# Patient Record
Sex: FEMALE | Race: WHITE | Employment: OTHER | URBAN - METROPOLITAN AREA
[De-identification: names, ages, dates, MRNs, and addresses within clinical notes are randomized per-mention and may not be internally consistent; named-entity substitution may affect disease eponyms.]

---

## 2017-01-24 ENCOUNTER — TELEPHONE (OUTPATIENT)
Dept: OBGYN CLINIC | Facility: CLINIC | Age: 47
End: 2017-01-24

## 2017-01-24 ENCOUNTER — HOSPITAL ENCOUNTER (OUTPATIENT)
Dept: ULTRASOUND IMAGING | Facility: HOSPITAL | Age: 47
Discharge: HOME | End: 2017-01-24
Attending: OBSTETRICS & GYNECOLOGY
Payer: COMMERCIAL

## 2017-01-24 DIAGNOSIS — R10.2 PELVIC PAIN IN FEMALE: ICD-10-CM

## 2017-01-24 PROCEDURE — 76856 US EXAM PELVIC COMPLETE: CPT

## 2017-01-24 PROCEDURE — 76830 TRANSVAGINAL US NON-OB: CPT

## 2017-01-24 PROCEDURE — 93975 VASCULAR STUDY: CPT

## 2017-01-24 NOTE — TELEPHONE ENCOUNTER
Per pt she was not able to schedule her pelvis us, she was told by the central scheduling a nurse from dr Philip Osei has to call it in and schedule it for the pt

## 2017-01-24 NOTE — TELEPHONE ENCOUNTER
Pt wants to have pelvic US done today since she has pain and is off work. Pt was told same day appts are not available. Called US and got pt appt for 1230 today at [facility].  Pt instructed to drink 32 oz of water, finish drinking by 1130 and keep fu

## 2017-01-26 NOTE — TELEPHONE ENCOUNTER
Informed pt that she had a normal pelvic ultrasound. Informed pt that she has a small but normal right ovarian cyst that is not of concern nor will cause pain. Informed pt that her IUD seems to be in place. Denies pain today.  Pt states Tuesday, 1/24

## 2017-02-15 ENCOUNTER — HOSPITAL ENCOUNTER (OUTPATIENT)
Dept: CT IMAGING | Age: 47
Discharge: HOME | End: 2017-02-15
Attending: INTERNAL MEDICINE

## 2017-02-15 VITALS — HEART RATE: 112 BPM | SYSTOLIC BLOOD PRESSURE: 134 MMHG | DIASTOLIC BLOOD PRESSURE: 98 MMHG

## 2017-02-15 DIAGNOSIS — Z82.49 FAMILY HISTORY OF HEART DISEASE: ICD-10-CM

## 2017-02-24 PROCEDURE — 87624 HPV HI-RISK TYP POOLED RSLT: CPT | Performed by: OBSTETRICS & GYNECOLOGY

## 2017-02-24 PROCEDURE — 88175 CYTOPATH C/V AUTO FLUID REDO: CPT | Performed by: OBSTETRICS & GYNECOLOGY

## 2017-06-29 PROCEDURE — 87427 SHIGA-LIKE TOXIN AG IA: CPT | Performed by: INTERNAL MEDICINE

## 2017-06-29 PROCEDURE — 87177 OVA AND PARASITES SMEARS: CPT | Performed by: INTERNAL MEDICINE

## 2017-06-29 PROCEDURE — 87046 STOOL CULTR AEROBIC BACT EA: CPT | Performed by: INTERNAL MEDICINE

## 2017-06-29 PROCEDURE — 87269 GIARDIA AG IF: CPT | Performed by: INTERNAL MEDICINE

## 2017-06-29 PROCEDURE — 87045 FECES CULTURE AEROBIC BACT: CPT | Performed by: INTERNAL MEDICINE

## 2017-06-29 PROCEDURE — 87493 C DIFF AMPLIFIED PROBE: CPT | Performed by: INTERNAL MEDICINE

## 2017-06-29 PROCEDURE — 87209 SMEAR COMPLEX STAIN: CPT | Performed by: INTERNAL MEDICINE

## 2020-03-23 PROCEDURE — 88342 IMHCHEM/IMCYTCHM 1ST ANTB: CPT | Performed by: RADIOLOGY

## 2020-03-23 PROCEDURE — 88305 TISSUE EXAM BY PATHOLOGIST: CPT | Performed by: RADIOLOGY

## 2020-03-23 PROCEDURE — 88360 TUMOR IMMUNOHISTOCHEM/MANUAL: CPT | Performed by: RADIOLOGY

## 2020-03-31 PROBLEM — C50.912 INVASIVE LOBULAR CARCINOMA OF LEFT BREAST IN FEMALE (HCC): Status: ACTIVE | Noted: 2020-03-31

## 2020-07-27 PROCEDURE — 88305 TISSUE EXAM BY PATHOLOGIST: CPT | Performed by: INTERNAL MEDICINE

## 2020-12-21 ENCOUNTER — ORDER TRANSCRIPTION (OUTPATIENT)
Dept: PHYSICAL THERAPY | Facility: HOSPITAL | Age: 50
End: 2020-12-21

## 2020-12-21 ENCOUNTER — TELEPHONE (OUTPATIENT)
Dept: PHYSICAL THERAPY | Facility: HOSPITAL | Age: 50
End: 2020-12-21

## 2020-12-21 DIAGNOSIS — Z98.890 H/O BREAST RECONSTRUCTION: Primary | ICD-10-CM

## 2020-12-22 ENCOUNTER — TELEPHONE (OUTPATIENT)
Dept: PHYSICAL THERAPY | Facility: HOSPITAL | Age: 50
End: 2020-12-22

## 2020-12-22 ENCOUNTER — ORDER TRANSCRIPTION (OUTPATIENT)
Dept: PHYSICAL THERAPY | Facility: HOSPITAL | Age: 50
End: 2020-12-22

## 2020-12-22 DIAGNOSIS — Z98.890 H/O BREAST RECONSTRUCTION: Primary | ICD-10-CM

## 2020-12-23 ENCOUNTER — OFFICE VISIT (OUTPATIENT)
Dept: PHYSICAL THERAPY | Facility: HOSPITAL | Age: 50
End: 2020-12-23
Attending: PHYSICIAN ASSISTANT
Payer: COMMERCIAL

## 2020-12-23 DIAGNOSIS — Z98.890 H/O BREAST RECONSTRUCTION: ICD-10-CM

## 2020-12-23 PROCEDURE — 97161 PT EVAL LOW COMPLEX 20 MIN: CPT | Performed by: PHYSICAL THERAPIST

## 2020-12-23 PROCEDURE — 97140 MANUAL THERAPY 1/> REGIONS: CPT | Performed by: PHYSICAL THERAPIST

## 2020-12-23 NOTE — PROGRESS NOTES
UE LYMPHEDEMA EVALUATION:     Referring Physician: Dr. Tash Claros  Diagnosis:  H/O breast reconstruction (E10.055)    Date of onset: 4/15/2020 Evaluation Date: 12/23/2020     PATIENT SUMMARY   Esteban Diaz is a 45year old female who presents to therapy tod therapist discussed evaluation findings, lymphedema education, POC and self care/management.  Skilled Physical Therapy is medically necessary for stretching, strengthening, posture re-education,  Complete Decongestive Therapy to reduce swelling and decrease (directing to R axilla and R inguinal: pt risk for lymphedema is the left side)    Initiated self MLD instructions    Compression:  - will measure/fit for compression sleeve at next appt      There Ex ( 5 minutes):  - given written instr for ROM exercises return this letter via fax as soon as possible to [phone].  If you have any questions, please contact me at Dept: [phone]    Sincerely,  Electronically signed by therapist: Milla Palma, PT  [de-identified] certification required: Yes  I certify the

## 2020-12-24 ENCOUNTER — TELEPHONE (OUTPATIENT)
Dept: PHYSICAL THERAPY | Facility: HOSPITAL | Age: 50
End: 2020-12-24

## 2020-12-28 ENCOUNTER — APPOINTMENT (OUTPATIENT)
Dept: PHYSICAL THERAPY | Facility: HOSPITAL | Age: 50
End: 2020-12-28
Attending: PHYSICIAN ASSISTANT
Payer: COMMERCIAL

## 2021-01-06 ENCOUNTER — OFFICE VISIT (OUTPATIENT)
Dept: PHYSICAL THERAPY | Facility: HOSPITAL | Age: 51
End: 2021-01-06
Attending: PHYSICIAN ASSISTANT
Payer: COMMERCIAL

## 2021-01-06 PROCEDURE — 97110 THERAPEUTIC EXERCISES: CPT | Performed by: PHYSICAL THERAPIST

## 2021-01-06 NOTE — PROGRESS NOTES
Referring Physician: Dr. Davida Puga  Diagnosis:  H/O breast reconstruction (B76.213)    Date of onset: 4/15/2020 Evaluation Date: 12/23/2020           Physical Therapy Lymphedema Daily Note    Precautions:  Breast cancer   Education or treatment limitation: instructions    Compression:  - will measure/fit for compression sleeve at next appt   Manual Therapy- HOLD   There Ex ( 5 minutes):  - given written instr for ROM exercises    There Ex (50 min):  - reassessed ROM  - pulleys flex and abd x 5 minutes  - MELLY

## 2021-01-08 ENCOUNTER — OFFICE VISIT (OUTPATIENT)
Dept: PHYSICAL THERAPY | Facility: HOSPITAL | Age: 51
End: 2021-01-08
Attending: PHYSICIAN ASSISTANT
Payer: COMMERCIAL

## 2021-01-08 PROCEDURE — 97110 THERAPEUTIC EXERCISES: CPT | Performed by: PHYSICAL THERAPIST

## 2021-01-08 NOTE — PROGRESS NOTES
Referring Physician: Dr. Sami Fernandez  Diagnosis:  H/O breast reconstruction (P32.779)    Date of onset: 4/15/2020 Evaluation Date: 12/23/2020           Physical Therapy Lymphedema Daily Note    Precautions:  Breast cancer   Education or treatment limitation: instructions    Compression:  - will measure/fit for compression sleeve at next appt   Manual Therapy- HOLD    There Ex ( 5 minutes):  - given written instr for ROM exercises    There Ex (50 min):  - reassessed ROM  - pulleys flex and abd x 5 minutes  - ARIN

## 2021-01-12 ENCOUNTER — OFFICE VISIT (OUTPATIENT)
Dept: PHYSICAL THERAPY | Facility: HOSPITAL | Age: 51
End: 2021-01-12
Attending: PHYSICIAN ASSISTANT
Payer: COMMERCIAL

## 2021-01-12 PROCEDURE — 97140 MANUAL THERAPY 1/> REGIONS: CPT

## 2021-01-14 ENCOUNTER — APPOINTMENT (OUTPATIENT)
Dept: PHYSICAL THERAPY | Facility: HOSPITAL | Age: 51
End: 2021-01-14
Attending: PHYSICIAN ASSISTANT
Payer: COMMERCIAL

## 2021-01-19 ENCOUNTER — OFFICE VISIT (OUTPATIENT)
Dept: PHYSICAL THERAPY | Facility: HOSPITAL | Age: 51
End: 2021-01-19
Attending: PHYSICIAN ASSISTANT
Payer: COMMERCIAL

## 2021-01-19 PROCEDURE — 97110 THERAPEUTIC EXERCISES: CPT | Performed by: PHYSICAL THERAPIST

## 2021-01-19 NOTE — PROGRESS NOTES
Referring Physician: Dr. Geraldine Benedict  Diagnosis:  H/O breast reconstruction (W86.473)    Date of onset: 4/15/2020 Evaluation Date: 12/23/2020           Physical Therapy Lymphedema Daily Note    Precautions:  Breast cancer   Education or treatment limitation: To:3/23/2021   * NEW ORDER FROM MD, NO LYMPHEDEMA THERAPY, ONLY TO DO ROM * 1/6/2021    1/12/2021 1/19/2021   4/10 5/10   Manual therapy - HOLD until MD order received Manual therapy - HOLD until MD order received   There Ex (      - Hookly LTR (to stretch Increase UE strength to 4+/5 to improve ease of lifting and performing household chores    Plan:   Cont w/ ROM exercises  Await approval from MD for lymphedema, manual therapy         Charges: therex 3    Total Timed Treatment: 45 min  Total Treatment Time

## 2021-01-21 ENCOUNTER — OFFICE VISIT (OUTPATIENT)
Dept: PHYSICAL THERAPY | Facility: HOSPITAL | Age: 51
End: 2021-01-21
Attending: PHYSICIAN ASSISTANT
Payer: COMMERCIAL

## 2021-01-21 PROCEDURE — 97140 MANUAL THERAPY 1/> REGIONS: CPT

## 2021-01-21 NOTE — PROGRESS NOTES
Referring Physician: Dr. Cuellar Number  Diagnosis:  H/O breast reconstruction (Y93.657)    Date of onset: 4/15/2020 Evaluation Date: 12/23/2020           Physical Therapy Lymphedema Daily Note    Precautions:  Breast cancer   Education or treatment limitation: 1/19/2021 1/21/2021   4/10 5/10 6/10   Manual therapy - HOLD until MD order received Manual therapy - HOLD until MD order received Manual therapy (40 minutes)  (No A-A as L side at risk for lymphedema)  MLD provided:  Neck sequence, deep breathing, abdomin MLD steps while performing treatment    Goals:    To be met in 10 visits:  1) Decrease swelling trunk/breast/abd areas by a total of 4 cm to improve ROM and fit of clothing - Ning Vides MD APPROVAL  2) Pt to be independent with self MLD, ROM exercis

## 2021-01-26 ENCOUNTER — OFFICE VISIT (OUTPATIENT)
Dept: PHYSICAL THERAPY | Facility: HOSPITAL | Age: 51
End: 2021-01-26
Attending: PHYSICIAN ASSISTANT
Payer: COMMERCIAL

## 2021-01-26 PROCEDURE — 97140 MANUAL THERAPY 1/> REGIONS: CPT | Performed by: PHYSICAL THERAPIST

## 2021-01-26 NOTE — PROGRESS NOTES
Referring Physician: Dr. Cali Beyer  Diagnosis:  H/O breast reconstruction (T56.042)    Date of onset: 4/15/2020 Evaluation Date: 12/23/2020           Physical Therapy Lymphedema Daily Note    Precautions:  Breast cancer   Education or treatment limitation: B breasts (especially the right)  - swelling B trunk and abdominal area   - circumference:    - under axilla (arms down): 104.6    - 14 cm inf from sternal notch: 108.8    - 21 cm inf from sternal notch:108.3    - 30 cm inf from sternal notch: 92.8  - inci str x 20 s x 4 (different levels)  - Wall wash circles in flex and abd CW and CCW x 10 each B  - SB flex up wall x 10  - SB side to side roll in flex x 10   - SB gentle thoracic extension at wall, back on ball x 15  *discussed with pt different sizes of SB

## 2021-01-28 ENCOUNTER — OFFICE VISIT (OUTPATIENT)
Dept: PHYSICAL THERAPY | Facility: HOSPITAL | Age: 51
End: 2021-01-28
Attending: PHYSICIAN ASSISTANT
Payer: COMMERCIAL

## 2021-01-28 PROCEDURE — 97140 MANUAL THERAPY 1/> REGIONS: CPT

## 2021-01-28 NOTE — PROGRESS NOTES
Referring Physician: Dr. Maya Kelly  Diagnosis:  H/O breast reconstruction (A29.974)    Date of onset: 4/15/2020 Evaluation Date: 12/23/2020           Physical Therapy Lymphedema Daily Note    Precautions:  Breast cancer   Education or treatment limitation: (but slight tightness noted on R)    ER 90 degrees B    Shoulder strength: Deferred due to limited ROM    Edema/Tissue Observations:    - swelling B breasts (especially the right)  - swelling B trunk and abdominal area   - circumference:    - under axilla and trunk, B A-I. Scar massage of B inferior breast scars. Pt has purchased silicone scar pad and hopes to have it by tomorrow. Compression:  -Pt wears foam pad as needed.   -pt was fitted for compression bras and compression sleeve by [facility] once discharged  3) Increase B shoulder AROM flex and abd to 170 degrees to improve ease of dressing/bathing/and reaching overhead  4) Increase UE strength to 4+/5 to improve ease of lifting and performing household chores    Plan:   Cont w/ ROM

## 2021-02-01 ENCOUNTER — OFFICE VISIT (OUTPATIENT)
Dept: PHYSICAL THERAPY | Facility: HOSPITAL | Age: 51
End: 2021-02-01
Attending: PHYSICIAN ASSISTANT
Payer: COMMERCIAL

## 2021-02-01 PROCEDURE — 97140 MANUAL THERAPY 1/> REGIONS: CPT

## 2021-02-01 PROCEDURE — 97110 THERAPEUTIC EXERCISES: CPT

## 2021-02-01 NOTE — PATIENT INSTRUCTIONS
*these will help stretch your Latissimus Muscle    1. Get in position as shown    Hold 10 seconds    Repeat 10 times    2.  Upward salute (Yoga pose)  Hold 10 seconds  Repeat 10 times

## 2021-02-03 ENCOUNTER — OFFICE VISIT (OUTPATIENT)
Dept: PHYSICAL THERAPY | Facility: HOSPITAL | Age: 51
End: 2021-02-03
Attending: PHYSICIAN ASSISTANT
Payer: COMMERCIAL

## 2021-02-03 PROCEDURE — 97140 MANUAL THERAPY 1/> REGIONS: CPT

## 2021-02-03 NOTE — PROGRESS NOTES
Referring Physician: Dr. Maya Kelly  Diagnosis:  H/O breast reconstruction (D99.853)    Date of onset: 4/15/2020 Evaluation Date: 12/23/2020       Progress Summary    Pt has attended 10  visits in Physical Therapy.      ASSESSMENT: Patient has improved AROM 168, L 160     Abd: R 167, L 160  - swelling R breast, mostly medially (+pitting)  - swelling L breast, mostly laterally   - swelling B trunk and abdominal area   - circumference:    - under axilla (arms down):102.8  (IE: 104.6)    - 14 cm inf from sternal breast and trunk, B A-I. Scar massage of B inferior breast scars. Pt has purchased silicone scar pad and hopes to have it by tomorrow. Compression:  -Pt wears foam pad as needed.   -pt was fitted for compression bras and compression sleeve by [employer] w/ ROM exercises, manual techniques to improve scar mobility, MLD of B breast  MLD approved by MD 1/21/2021        Charges: mm4  Total Timed Treatment: 58 min  Total Treatment Time: 58min

## 2021-02-05 ENCOUNTER — APPOINTMENT (OUTPATIENT)
Dept: PHYSICAL THERAPY | Facility: HOSPITAL | Age: 51
End: 2021-02-05
Attending: PHYSICIAN ASSISTANT
Payer: COMMERCIAL

## 2021-02-08 ENCOUNTER — OFFICE VISIT (OUTPATIENT)
Dept: PHYSICAL THERAPY | Facility: HOSPITAL | Age: 51
End: 2021-02-08
Attending: PHYSICIAN ASSISTANT
Payer: COMMERCIAL

## 2021-02-08 PROCEDURE — 97140 MANUAL THERAPY 1/> REGIONS: CPT | Performed by: PHYSICAL THERAPIST

## 2021-02-08 NOTE — PROGRESS NOTES
Referring Physician: Dr. Juan Weaver  Diagnosis:  H/O breast reconstruction (I66.944)    Date of onset: 4/15/2020 Evaluation Date: 12/23/2020         Physical Therapy Lymphedema Daily Note    Precautions:  Breast cancer   Education or treatment limitation: No mod loss, Rot and SB min loss  Poor thoracic mobility    1/12/2021  AROM:     Shoulder AROM:    Flex: R 150, L 140 (improved about 10 deg)    1/6/2021:  AROM:     Shoulder AROM:    Flex: R 150, L 130    Abd: R 158, L 160    IR WFL (but slight tightness not B axilla, B inguinal, B breast and trunk, B A-I. Scar massage to B inferior breast scars. Pt received silicone scar pad. Compression:  Pt wearing new compression bra. Pt also has new OTS compression sleeve and gauntlet.   (both were purchased throug 2/3/2021    Plan:   Cont w/ ROM exercises, manual techniques to improve scar mobility, MLD of B breast  MLD approved by MD 1/21/2021        Charges: mm3    Total Timed Treatment: 45 min  Total Treatment Time: 45 min

## 2021-02-12 ENCOUNTER — OFFICE VISIT (OUTPATIENT)
Dept: PHYSICAL THERAPY | Facility: HOSPITAL | Age: 51
End: 2021-02-12
Attending: PHYSICIAN ASSISTANT
Payer: COMMERCIAL

## 2021-02-12 PROCEDURE — 97140 MANUAL THERAPY 1/> REGIONS: CPT

## 2021-02-12 NOTE — PROGRESS NOTES
Referring Physician: Dr. Deshawn Ireland  Diagnosis:  H/O breast reconstruction (C75.659)    Date of onset: 4/15/2020 Evaluation Date: 12/23/2020         Physical Therapy Lymphedema Daily Note    Precautions:  Breast cancer   Education or treatment limitation: N ext mod loss, Rot and SB min loss  Poor thoracic mobility    1/12/2021  AROM:     Shoulder AROM:    Flex: R 150, L 140 (improved about 10 deg)    1/6/2021:  AROM:     Shoulder AROM:    Flex: R 150, L 130    Abd: R 158, L 160    IR WFL (but slight tightness abdominal sequence, B axilla, B inguinal, B breast and trunk, B A-I. Scar massage to B inferior breast scars. Pt received silicone scar pad. Compression:  Pt wearing new compression bra. Pt also has new OTS compression sleeve and gauntlet.   (both w reduction and to be independent at self management once discharged - ALMOST MET 2/3/2021  3) Increase B shoulder AROM flex and abd to 170 degrees to improve ease of dressing/bathing/and reaching overhead - ALMOST MET 2/3/2021  4) Increase UE strength to 4+

## 2021-02-15 ENCOUNTER — TELEPHONE (OUTPATIENT)
Dept: PHYSICAL THERAPY | Facility: HOSPITAL | Age: 51
End: 2021-02-15

## 2021-02-15 ENCOUNTER — APPOINTMENT (OUTPATIENT)
Dept: PHYSICAL THERAPY | Facility: HOSPITAL | Age: 51
End: 2021-02-15
Attending: PHYSICIAN ASSISTANT
Payer: COMMERCIAL

## 2021-02-15 NOTE — TELEPHONE ENCOUNTER
Pt called to cx due to weather and also has another appt she cannot miss that she wouldn't have time to drive to w/ the bad weather

## 2021-02-18 ENCOUNTER — OFFICE VISIT (OUTPATIENT)
Dept: PHYSICAL THERAPY | Facility: HOSPITAL | Age: 51
End: 2021-02-18
Attending: PHYSICIAN ASSISTANT
Payer: COMMERCIAL

## 2021-02-18 PROCEDURE — 97140 MANUAL THERAPY 1/> REGIONS: CPT

## 2021-02-18 NOTE — PROGRESS NOTES
Referring Physician: Dr. Jc Neri ref.  provider found  Diagnosis:  H/O breast reconstruction (Z93.009)    Date of onset: 4/15/2020 Evaluation Date: 12/23/2020         Physical Therapy Lymphedema Daily Note    Precautions:  Breast cancer   Education or treatmen Shoulder AROM:    Flex: 155 B     Abd: R 160, L 150  Lumbar AROM: flex WFL, ext mod loss, Rot and SB min loss  Poor thoracic mobility    1/12/2021  AROM:     Shoulder AROM:    Flex: R 150, L 140 (improved about 10 deg)    1/6/2021:  AROM:     Shoulder A minutes)    MLD:   (No A-A as L side at risk for lymphedema)  MLD provided:  Neck sequence, deep breathing, abdominal sequence, B axilla, B inguinal, B breast and trunk, B A-I. Scar massage to B inferior breast scars. Pt received silicone scar pad. techniques provided. Scar tissue massage to release lateral L scar with improved mobility noted at end of session. Goals:    To be met in 10 visits:  1) Decrease swelling trunk/breast/abd areas by a total of 4 cm to improve ROM and fit of clothing - ALMOS

## 2021-02-23 ENCOUNTER — OFFICE VISIT (OUTPATIENT)
Dept: PHYSICAL THERAPY | Facility: HOSPITAL | Age: 51
End: 2021-02-23
Attending: PHYSICIAN ASSISTANT
Payer: COMMERCIAL

## 2021-02-23 PROCEDURE — 97140 MANUAL THERAPY 1/> REGIONS: CPT | Performed by: PHYSICAL THERAPIST

## 2021-02-23 NOTE — PROGRESS NOTES
Referring Physician: Dr. Shad Natarajan  Diagnosis:  H/O breast reconstruction (X86.515)    Date of onset: 4/15/2020 Evaluation Date: 12/23/2020         Physical Therapy Lymphedema Daily Note    Precautions:  Breast cancer   Education or treatment limitation: N (IE) 92.8  -  1/19/2021:   AROM:     Shoulder AROM:    Flex: 155 B     Abd: R 160, L 150  Lumbar AROM: flex WFL, ext mod loss, Rot and SB min loss  Poor thoracic mobility    1/12/2021  AROM:     Shoulder AROM:    Flex: R 150, L 140 (improved about 10 deg) and trunk, B A-I.   Scar massage to B inferior breast    Scap mobs B Manual therapy (43 minutes)  MLD (No A-A as L side at risk for lymphedema)  MLD provided:  Neck sequence, deep breathing, abdominal sequence, B axilla, B inguinal, B breast and trunk, B A-

## 2021-02-25 ENCOUNTER — OFFICE VISIT (OUTPATIENT)
Dept: PHYSICAL THERAPY | Facility: HOSPITAL | Age: 51
End: 2021-02-25
Attending: PHYSICIAN ASSISTANT
Payer: COMMERCIAL

## 2021-02-25 PROCEDURE — 97140 MANUAL THERAPY 1/> REGIONS: CPT | Performed by: PHYSICAL THERAPIST

## 2021-02-25 NOTE — PROGRESS NOTES
Referring Physician: Dr. Sami Fernandez  Diagnosis:  H/O breast reconstruction (Q95.213)    Date of onset: 4/15/2020 Evaluation Date: 12/23/2020         Physical Therapy Lymphedema Daily Note    Precautions:  Breast cancer   Education or treatment limitation: N 104. 6)    - 14 cm inf from sternal notch (over breasts):109.5  (IE: 108.8)    - 21 cm inf from sternal notch (over breasts): 108.3  (.3)    - 30 cm inf from sternal notch: 93  (IE) 92.8  -  1/19/2021:   AROM:     Shoulder AROM:    Flex: 155 B     Abd to notify the surgeon Manual therapy (40 minutes)    MLD (No A-A as L side at risk for lymphedema)  MLD provided:  Neck sequence, deep breathing, abdominal sequence, B axilla, B inguinal, B breast and trunk, B A-I.   Scar massage to B inferior breast    Sca 2/3/2021  4) Increase UE strength to 4+/5 to improve ease of lifting and performing household chores - MET 2/3/2021    Plan:   Next session: will train patient how to apply kinesiotape to be able to self manage  Cont w/ ROM exercises, manual techniques to

## 2021-03-02 ENCOUNTER — OFFICE VISIT (OUTPATIENT)
Dept: PHYSICAL THERAPY | Facility: HOSPITAL | Age: 51
End: 2021-03-02
Attending: PHYSICIAN ASSISTANT
Payer: COMMERCIAL

## 2021-03-02 PROCEDURE — 97140 MANUAL THERAPY 1/> REGIONS: CPT

## 2021-03-02 NOTE — PROGRESS NOTES
Referring Physician: Dr. Tash Claros  Diagnosis:  H/O breast reconstruction (V15.266)    Date of onset: 4/15/2020 Evaluation Date: 12/23/2020         Physical Therapy Lymphedema Daily Note    Precautions:  Breast cancer   Education or treatment limitation: N L 160  - swelling R breast, mostly medially (+pitting)  - swelling L breast, mostly laterally   - swelling B trunk and abdominal area   - circumference:    - under axilla (arms down):102.8  (IE: 104.6)    - 14 cm inf from sternal notch (over breasts):109.5 breathing, abdominal sequence, B axilla, B inguinal, B breast and trunk, B A-I.     Scar massage to B inferior    Instr pt to monitor area of scratches- if increased redness or swelling/or any signs of infection to notify the surgeon Manual therapy (40 art reduce swelling medial and lateral breast tissue.   *reviewed with pt tape cutting techniques to make \"fingers\" for swelling reduction taping technique      Performing yoga at home    *Pt is independent with her HEP                 Assessment: Continues t

## 2021-03-09 ENCOUNTER — OFFICE VISIT (OUTPATIENT)
Dept: PHYSICAL THERAPY | Facility: HOSPITAL | Age: 51
End: 2021-03-09
Attending: PHYSICIAN ASSISTANT
Payer: COMMERCIAL

## 2021-03-09 PROCEDURE — 97140 MANUAL THERAPY 1/> REGIONS: CPT | Performed by: PHYSICAL THERAPIST

## 2021-03-22 ENCOUNTER — OFFICE VISIT (OUTPATIENT)
Dept: PHYSICAL THERAPY | Facility: HOSPITAL | Age: 51
End: 2021-03-22
Attending: PHYSICIAN ASSISTANT
Payer: COMMERCIAL

## 2021-03-22 PROCEDURE — 97140 MANUAL THERAPY 1/> REGIONS: CPT | Performed by: PHYSICAL THERAPIST

## 2021-03-22 NOTE — PROGRESS NOTES
Referring Physician: Dr. Alanna Palacios  Diagnosis:  H/O breast reconstruction (S61.250)    Date of onset: 4/15/2020 Evaluation Date: 12/23/2020     Discharge Summary    Pt has attended 18 visits in Physical Therapy.      Assessment: Pt w/ improved ROM, strength MEASUREMENT G 35 34.8   MEASUREMENT H 37.4 37.3   MEASUREMENT I 39.6 39.6    TOTAL VOLUME  [id number]   % DIFFERENCE -0.45588 1.26818              3/9/2021:    - mild swelling R medial breast   - circumference:    - under axilla (arms down):10 AROM:    Flex: R 168, L 160     Abd: R 167, L 160  - swelling R breast, mostly medially (+pitting)  - swelling L breast, mostly laterally   - swelling B trunk and abdominal area   - circumference:    - under axilla (arms down):102.8  (IE: 104.6)    - 14 cm provided:  Neck sequence, deep breathing, abdominal sequence, B axilla, B inguinal, B breast and trunk, B A-I.  Most time spent on right side    Scar massage to B inferior breast    Kinesiotaping:  - scar taping R inferior breast scar  - swelling reduction and fit of clothing -  MET  2) Pt to be independent with self MLD, ROM exercises, and donning/doffing compression bandages/garments to facilitate swelling reduction and to be independent at self management once discharged - MET  3) Increase B shoulder AROM

## 2022-03-29 ENCOUNTER — APPOINTMENT (RX ONLY)
Dept: URBAN - METROPOLITAN AREA CLINIC 111 | Facility: CLINIC | Age: 52
Setting detail: DERMATOLOGY
End: 2022-03-29

## 2022-03-29 DIAGNOSIS — L57.3 POIKILODERMA OF CIVATTE: ICD-10-CM

## 2022-03-29 DIAGNOSIS — D22 MELANOCYTIC NEVI: ICD-10-CM

## 2022-03-29 DIAGNOSIS — L81.1 CHLOASMA: ICD-10-CM | Status: INADEQUATELY CONTROLLED

## 2022-03-29 DIAGNOSIS — L81.4 OTHER MELANIN HYPERPIGMENTATION: ICD-10-CM

## 2022-03-29 DIAGNOSIS — L57.8 OTHER SKIN CHANGES DUE TO CHRONIC EXPOSURE TO NONIONIZING RADIATION: ICD-10-CM

## 2022-03-29 DIAGNOSIS — Z71.89 OTHER SPECIFIED COUNSELING: ICD-10-CM

## 2022-03-29 PROBLEM — D22.62 MELANOCYTIC NEVI OF LEFT UPPER LIMB, INCLUDING SHOULDER: Status: ACTIVE | Noted: 2022-03-29

## 2022-03-29 PROBLEM — D23.72 OTHER BENIGN NEOPLASM OF SKIN OF LEFT LOWER LIMB, INCLUDING HIP: Status: ACTIVE | Noted: 2022-03-29

## 2022-03-29 PROBLEM — D22.5 MELANOCYTIC NEVI OF TRUNK: Status: ACTIVE | Noted: 2022-03-29

## 2022-03-29 PROBLEM — D23.71 OTHER BENIGN NEOPLASM OF SKIN OF RIGHT LOWER LIMB, INCLUDING HIP: Status: ACTIVE | Noted: 2022-03-29

## 2022-03-29 PROBLEM — D22.61 MELANOCYTIC NEVI OF RIGHT UPPER LIMB, INCLUDING SHOULDER: Status: ACTIVE | Noted: 2022-03-29

## 2022-03-29 PROCEDURE — ? PRESCRIPTION

## 2022-03-29 PROCEDURE — ? COUNSELING

## 2022-03-29 PROCEDURE — 99204 OFFICE O/P NEW MOD 45 MIN: CPT

## 2022-03-29 PROCEDURE — ? ADDITIONAL NOTES

## 2022-03-29 PROCEDURE — ? SUNSCREEN RECOMMENDATIONS

## 2022-03-29 PROCEDURE — ? PRESCRIPTION MEDICATION MANAGEMENT

## 2022-03-29 ASSESSMENT — LOCATION SIMPLE DESCRIPTION DERM
LOCATION SIMPLE: LEFT ANTERIOR NECK
LOCATION SIMPLE: LEFT FOREARM
LOCATION SIMPLE: LEFT POSTERIOR UPPER ARM
LOCATION SIMPLE: CHEST
LOCATION SIMPLE: LEFT BUTTOCK
LOCATION SIMPLE: RIGHT UPPER BACK
LOCATION SIMPLE: UPPER LIP
LOCATION SIMPLE: RIGHT SHOULDER
LOCATION SIMPLE: RIGHT UPPER ARM
LOCATION SIMPLE: LEFT SHOULDER

## 2022-03-29 ASSESSMENT — LOCATION ZONE DERM
LOCATION ZONE: NECK
LOCATION ZONE: LIP
LOCATION ZONE: TRUNK
LOCATION ZONE: ARM

## 2022-03-29 ASSESSMENT — LOCATION DETAILED DESCRIPTION DERM
LOCATION DETAILED: LEFT DISTAL POSTERIOR UPPER ARM
LOCATION DETAILED: MIDDLE STERNUM
LOCATION DETAILED: LEFT DISTAL DORSAL FOREARM
LOCATION DETAILED: LEFT INFERIOR ANTERIOR NECK
LOCATION DETAILED: LEFT BUTTOCK
LOCATION DETAILED: RIGHT POSTERIOR SHOULDER
LOCATION DETAILED: RIGHT INFERIOR MEDIAL UPPER BACK
LOCATION DETAILED: LEFT POSTERIOR SHOULDER
LOCATION DETAILED: RIGHT MEDIAL UPPER BACK
LOCATION DETAILED: PHILTRUM
LOCATION DETAILED: RIGHT ANTERIOR PROXIMAL UPPER ARM

## 2022-03-29 NOTE — PROCEDURE: PRESCRIPTION MEDICATION MANAGEMENT
Plan: Follow up prn\\n\\nRecommended cosmetic treatment options including microneedling with PRP or PRX on the face.
Initiate Treatment: -\\n- Bleaching Cream (12% hydroquinone, 6% kojic acid (bulk): Apply to dark areas nightly for 6 weeks on, then take 1 week off. Repeat cycle. D/c after 4 months of use.\\n- Sunscreen every morning (recommend Eryfotona)
Detail Level: Zone
Render In Strict Bullet Format?: Yes

## 2022-03-29 NOTE — PROCEDURE: ADDITIONAL NOTES
Render Risk Assessment In Note?: no
Additional Notes: Recommend series of IPL on the chest.
Detail Level: Simple

## 2022-03-29 NOTE — HPI: SKIN LESIONS
Is This A New Presentation, Or A Follow-Up?: Skin Lesions
How Severe Is Your Skin Lesion?: moderate
Have Your Skin Lesions Been Treated?: not been treated
Additional History: Patient had breast cancer and has scarring from 2020.

## 2023-01-05 ENCOUNTER — RX ONLY (OUTPATIENT)
Age: 53
Setting detail: RX ONLY
End: 2023-01-05

## (undated) NOTE — LETTER
Referring Physician: Dr. Sami Fernandez  Diagnosis:  H/O breast reconstruction (W17.377)    Date of onset: 4/15/2020 Evaluation Date: 12/23/2020       Progress Summary    Pt has attended 10  visits in Physical Therapy.      ASSESSMENT: Patient has improved AROM an

## (undated) NOTE — LETTER
Patient Name: Ramon Salcedo  YOB: 1970          MRN number:  [id number]  Date:  12/23/2020  Referring Physician:  Flor BALDERAS LYMPHEDEMA EVALUATION:      Referring Physician: Dr. Luz Maria Bolanos  Diagnosis:  H/O breast reconstruction (Z Are you being hurt, frightened, demeaned, or taken advantage of by anyone at your home or in your life? No        Have you recently had thoughts of hurting yourself?   No    Have you tried to hurt yourself in the past?  No        Past medical history was r LIMB POSITION 75 degrees abd   STARTING POINT 16 cm from 3rd cuticle   MEASUREMENT A 16   MEASUREMENT B 18.6   MEASUREMENT C 22.8   MEASUREMENT D 26.3   MEASUREMENT E 27.2   MEASUREMENT F 30.8   MEASUREMENT G 35.2   MEASUREMENT H 38.1   MEASUREMENT I 40.3 3) Increase B shoulder AROM flex and abd to 170 degrees to improve ease of dressing/bathing/and reaching overhead  4) Increase UE strength to 4+/5 to improve ease of lifting and performing household chores      Frequency / Duration: Patient will be seen fo